# Patient Record
Sex: MALE
[De-identification: names, ages, dates, MRNs, and addresses within clinical notes are randomized per-mention and may not be internally consistent; named-entity substitution may affect disease eponyms.]

---

## 2017-09-25 ENCOUNTER — RX ONLY (OUTPATIENT)
Age: 77
Setting detail: RX ONLY
End: 2017-09-25

## 2021-02-09 ENCOUNTER — OFFICE VISIT (OUTPATIENT)
Dept: URBAN - METROPOLITAN AREA CLINIC 41 | Facility: CLINIC | Age: 81
End: 2021-02-09
Payer: MEDICARE

## 2021-02-09 PROCEDURE — 99214 OFFICE O/P EST MOD 30 MIN: CPT | Performed by: OPHTHALMOLOGY

## 2021-02-09 PROCEDURE — 92134 CPTRZ OPH DX IMG PST SGM RTA: CPT | Performed by: OPHTHALMOLOGY

## 2021-02-09 PROCEDURE — 92235 FLUORESCEIN ANGRPH MLTIFRAME: CPT | Performed by: OPHTHALMOLOGY

## 2021-02-09 PROCEDURE — 92250 FUNDUS PHOTOGRAPHY W/I&R: CPT | Performed by: OPHTHALMOLOGY

## 2021-02-09 ASSESSMENT — INTRAOCULAR PRESSURE
OD: 17
OS: 12

## 2021-02-09 NOTE — IMPRESSION/PLAN
Impression: mild NPDR w DME OU Plan: Exam/photos/OCT show mild NPDR with non-CSDME OU. FA sweeps 2/9/21 shows staining of macular MAs without leakage, good peripheral flow without ischemia OU. Discussed options of obs, anti-VEGF, and laser; recommend obs. Last A1c 6.8. Cont BS/BP/Chol control.

## 2021-02-09 NOTE — IMPRESSION/PLAN
Impression: BRVO w CME
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 FA sweeps 2/9/21 shows staining of focal scars, normal flow without leakage, good peripheral perfusion. Monitor for recurrence, Tx PRN. 

3 months, DFE/OCT OU

## 2021-05-04 ENCOUNTER — OFFICE VISIT (OUTPATIENT)
Dept: URBAN - METROPOLITAN AREA CLINIC 41 | Facility: CLINIC | Age: 81
End: 2021-05-04
Payer: MEDICARE

## 2021-05-04 PROCEDURE — 99213 OFFICE O/P EST LOW 20 MIN: CPT | Performed by: OPHTHALMOLOGY

## 2021-05-04 PROCEDURE — 92134 CPTRZ OPH DX IMG PST SGM RTA: CPT | Performed by: OPHTHALMOLOGY

## 2021-05-04 ASSESSMENT — INTRAOCULAR PRESSURE
OD: 9
OS: 6

## 2021-05-04 NOTE — IMPRESSION/PLAN
Impression: mild NPDR w DME OU Plan: Exam/OCT show mild NPDR with non-CSDME OU. FA sweeps 2/9/21 showed staining of macular MAs without leakage, good peripheral flow without ischemia OU. Discussed options of obs, anti-VEGF, and laser; recommend obs. Last A1c 6.8. Cont BS/BP/Chol control.

## 2021-05-04 NOTE — IMPRESSION/PLAN
Impression: BRVO w CME
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 FA sweeps 2/9/21 showed staining of focal scars, normal flow without leakage, good peripheral perfusion. Monitor for recurrence, Tx PRN. 

3 months, photos/OCT OU

## 2021-07-27 ENCOUNTER — OFFICE VISIT (OUTPATIENT)
Dept: URBAN - METROPOLITAN AREA CLINIC 41 | Facility: CLINIC | Age: 81
End: 2021-07-27
Payer: MEDICARE

## 2021-07-27 DIAGNOSIS — H34.8320 TRIBUTARY (BRANCH) RETINAL VEIN OCCLUSION, LEFT EYE, WITH MACULAR EDEMA: Primary | ICD-10-CM

## 2021-07-27 DIAGNOSIS — E11.3293 TYPE 2 DIABETES MELLITUS WITH MILD NONPROLIFERATIVE DIABETIC RETINOPATHY WITHOUT MACULAR EDEMA, BILATERAL: ICD-10-CM

## 2021-07-27 DIAGNOSIS — H43.813 BILATERAL VITREOUS DEGENERATION OF EYES: ICD-10-CM

## 2021-07-27 DIAGNOSIS — Z96.1 PRESENCE OF INTRAOCULAR LENS: ICD-10-CM

## 2021-07-27 PROCEDURE — 92134 CPTRZ OPH DX IMG PST SGM RTA: CPT | Performed by: OPHTHALMOLOGY

## 2021-07-27 PROCEDURE — 99213 OFFICE O/P EST LOW 20 MIN: CPT | Performed by: OPHTHALMOLOGY

## 2021-07-27 ASSESSMENT — INTRAOCULAR PRESSURE
OD: 15
OS: 11

## 2021-07-27 NOTE — IMPRESSION/PLAN
Impression: mild NPDR w DME OU Plan: Exam/photos/OCT show mild NPDR with non-CSDME OU. FA sweeps 2/9/21 showed staining of macular MAs without leakage, good peripheral flow without ischemia OU. Discussed options of obs, anti-VEGF, and laser; recommend obs. Last A1c 7.1. Cont BS/BP/Chol control.

## 2021-07-27 NOTE — IMPRESSION/PLAN
Impression: BRVO w CME
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 FA sweeps 2/9/21 showed staining of focal scars, normal flow without leakage, good peripheral perfusion. Monitor for recurrence, Tx PRN. 

3 months, OCT OU

## 2021-11-02 ENCOUNTER — OFFICE VISIT (OUTPATIENT)
Dept: URBAN - METROPOLITAN AREA CLINIC 41 | Facility: CLINIC | Age: 81
End: 2021-11-02
Payer: MEDICARE

## 2021-11-02 PROCEDURE — 99213 OFFICE O/P EST LOW 20 MIN: CPT | Performed by: OPHTHALMOLOGY

## 2021-11-02 PROCEDURE — 92134 CPTRZ OPH DX IMG PST SGM RTA: CPT | Performed by: OPHTHALMOLOGY

## 2021-11-02 ASSESSMENT — INTRAOCULAR PRESSURE
OS: 8
OD: 4

## 2021-11-02 NOTE — IMPRESSION/PLAN
Impression: BRVO w CME
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 FA sweeps 2/9/21 showed staining of focal scars, normal flow without leakage, good peripheral perfusion. Monitor for recurrence, Tx PRN. 

3 months, photos/FA/OCT OU

## 2021-11-02 NOTE — IMPRESSION/PLAN
Impression: mild NPDR w DME OU Plan: Exam/OCT show mild NPDR with non-CSDME OU. FA sweeps 2/9/21 showed staining of macular MAs without leakage, good peripheral flow without ischemia OU. Discussed options of obs, anti-VEGF, and laser; recommend obs. Last A1c 7.1. Cont BS/BP/Chol control.

## 2022-03-22 ENCOUNTER — OFFICE VISIT (OUTPATIENT)
Dept: URBAN - METROPOLITAN AREA CLINIC 41 | Facility: CLINIC | Age: 82
End: 2022-03-22
Payer: MEDICARE

## 2022-03-22 PROCEDURE — 92134 CPTRZ OPH DX IMG PST SGM RTA: CPT | Performed by: OPHTHALMOLOGY

## 2022-03-22 PROCEDURE — 92235 FLUORESCEIN ANGRPH MLTIFRAME: CPT | Performed by: OPHTHALMOLOGY

## 2022-03-22 PROCEDURE — 99214 OFFICE O/P EST MOD 30 MIN: CPT | Performed by: OPHTHALMOLOGY

## 2022-03-22 ASSESSMENT — INTRAOCULAR PRESSURE
OS: 10
OD: 10

## 2022-03-22 NOTE — IMPRESSION/PLAN
Impression: BRVO w CME
 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 Photos 3/22/22 showed old BRVO OS. Optos FA 3/22/22 showed a small area of non-perfusion in temporal macula w/o NV. Monitor for recurrence, Tx PRN. 

6 months, photos/OCT OU

## 2022-03-22 NOTE — IMPRESSION/PLAN
Impression: mild NPDR w DME OU Plan: Exam/OCT show mild NPDR with non-CSDME OU. Photos 3/22/22 showed mild NPDR OU. Optos FA 3/22/22 showed staining of macular MAs without leakage, good peripheral flow without ischemia OU. Discussed options of obs, anti-VEGF, and laser; recommend obs. Last A1c 7.1. Cont BS/BP/Chol control.